# Patient Record
Sex: MALE | Race: WHITE | Employment: STUDENT | URBAN - METROPOLITAN AREA
[De-identification: names, ages, dates, MRNs, and addresses within clinical notes are randomized per-mention and may not be internally consistent; named-entity substitution may affect disease eponyms.]

---

## 2017-05-23 ENCOUNTER — APPOINTMENT (OUTPATIENT)
Dept: ULTRASOUND IMAGING | Facility: HOSPITAL | Age: 22
End: 2017-05-23
Attending: EMERGENCY MEDICINE
Payer: COMMERCIAL

## 2017-05-23 ENCOUNTER — HOSPITAL ENCOUNTER (EMERGENCY)
Facility: HOSPITAL | Age: 22
Discharge: HOME | End: 2017-05-23
Attending: EMERGENCY MEDICINE
Payer: COMMERCIAL

## 2017-05-23 VITALS
WEIGHT: 200 LBS | BODY MASS INDEX: 23.62 KG/M2 | HEART RATE: 80 BPM | TEMPERATURE: 98 F | DIASTOLIC BLOOD PRESSURE: 77 MMHG | RESPIRATION RATE: 16 BRPM | SYSTOLIC BLOOD PRESSURE: 122 MMHG | HEIGHT: 77 IN | OXYGEN SATURATION: 98 %

## 2017-05-23 DIAGNOSIS — T14.8XXA HEMATOMA: Primary | ICD-10-CM

## 2017-05-23 PROCEDURE — 99284 EMERGENCY DEPT VISIT MOD MDM: CPT

## 2017-05-23 PROCEDURE — 93971 EXTREMITY STUDY: CPT | Performed by: EMERGENCY MEDICINE

## 2017-05-23 RX ORDER — IBUPROFEN 600 MG/1
600 TABLET ORAL EVERY 6 HOURS PRN
COMMUNITY

## 2017-05-23 NOTE — ED PROVIDER NOTES
Patient Seen in: [facility] Emergency Department    History   Patient presents with:  Deep Vein Thrombosis (cardiovascular)    Stated Complaint: right calf pain- hx of DVT    HPI    Is a 26-year-old male presents the emergency department for right c alert and oriented ×3 and appears in no distress  HEENT exam: Tympanic membranes are clear. Canals are normal with no auricular preauricular or mastoid tenderness. Oropharyngeal exam shows no uvula edema or shift.   There is no tongue elevation and palati

## 2017-05-23 NOTE — ED INITIAL ASSESSMENT (HPI)
Pt was at physical therapy for R knee and noted pain to calf.  Sent him here to [facility] DVT

## (undated) NOTE — ED AVS SNAPSHOT
[facility] Emergency Department    [place]  [address] [address]    Phone:  [phone]    Fax:  52 Gissel Lemus   MRN: [id number]    Department:  [facility] Emergency Department   Date of Visit:  5/23/20 IF THERE IS ANY CHANGE OR WORSENING OF YOUR CONDITION, CALL YOUR PRIMARY CARE PHYSICIAN AT ONCE OR RETURN IMMEDIATELY TO THE EMERGENCY DEPARTMENT.     If you have been prescribed any medication(s), please fill your prescription right away and begin taking t

## (undated) NOTE — ED AVS SNAPSHOT
[facility] Emergency Department    [place]  [address] [address]    Phone:  [phone]    Fax:  Beka Gissel Lemus   MRN: [id number]    Department:  [facility] Emergency Department   Date of Visit:  5/23/20 from our patient liason soon after your visit. Also, some patients receive a detailed feedback survey mailed to them a week after the visit. If you receive this, we would really appreciate it if you could take the time to complete it. Thank you!       You [facility] [address] [facility] [id number] ([address]) [facility] [address] [facility]. ([address]) [facility] evaluate the lower extremity venous system. B-mode two-dimensional images of the vascular structures, Doppler spectral analysis, and color flow. Doppler imaging were performed.   The   following veins were imaged:  Common, deep, and superficial femoral, po